# Patient Record
Sex: FEMALE | Race: WHITE | URBAN - METROPOLITAN AREA
[De-identification: names, ages, dates, MRNs, and addresses within clinical notes are randomized per-mention and may not be internally consistent; named-entity substitution may affect disease eponyms.]

---

## 2019-06-08 ENCOUNTER — APPOINTMENT (OUTPATIENT)
Dept: ULTRASOUND IMAGING | Facility: CLINIC | Age: 54
End: 2019-06-08
Attending: PHYSICIAN ASSISTANT

## 2019-06-08 ENCOUNTER — HOSPITAL ENCOUNTER (OUTPATIENT)
Facility: CLINIC | Age: 54
Setting detail: OBSERVATION
Discharge: HOME | End: 2019-06-09
Attending: FAMILY MEDICINE | Admitting: INTERNAL MEDICINE

## 2019-06-08 DIAGNOSIS — Z79.899 DRUG THERAPY: ICD-10-CM

## 2019-06-08 DIAGNOSIS — L03.114 CELLULITIS OF LEFT UPPER EXTREMITY: ICD-10-CM

## 2019-06-08 DIAGNOSIS — M45.9 ANKYLOSING SPONDYLITIS, UNSPECIFIED SITE OF SPINE (H): ICD-10-CM

## 2019-06-08 PROBLEM — E78.2 MIXED HYPERLIPIDEMIA: Status: ACTIVE | Noted: 2017-05-09

## 2019-06-08 PROBLEM — F32.A DEPRESSION: Status: ACTIVE | Noted: 2019-06-08

## 2019-06-08 PROBLEM — K21.9 ESOPHAGEAL REFLUX: Status: ACTIVE | Noted: 2019-06-08

## 2019-06-08 PROBLEM — L40.9 PSORIASIS: Status: ACTIVE | Noted: 2019-06-08

## 2019-06-08 PROBLEM — D84.9 IMMUNOSUPPRESSED STATUS (H): Status: ACTIVE | Noted: 2019-06-08

## 2019-06-08 PROBLEM — H35.371 EPIRETINAL MEMBRANE (ERM) OF RIGHT EYE: Status: ACTIVE | Noted: 2018-08-27

## 2019-06-08 PROBLEM — Z96.1 PSEUDOPHAKIA, BOTH EYES: Status: ACTIVE | Noted: 2018-10-02

## 2019-06-08 PROBLEM — N32.81 OVERACTIVE BLADDER: Status: ACTIVE | Noted: 2019-06-08

## 2019-06-08 PROBLEM — L08.9 SKIN INFECTION: Status: ACTIVE | Noted: 2019-06-08

## 2019-06-08 PROBLEM — F17.200 TOBACCO DEPENDENCE SYNDROME: Status: ACTIVE | Noted: 2019-06-08

## 2019-06-08 PROBLEM — R41.3 MEMORY DEFICIT: Status: ACTIVE | Noted: 2019-06-08

## 2019-06-08 LAB
ANION GAP SERPL CALCULATED.3IONS-SCNC: 8 MMOL/L (ref 3–14)
BASOPHILS # BLD AUTO: 0 10E9/L (ref 0–0.2)
BASOPHILS NFR BLD AUTO: 0.5 %
BUN SERPL-MCNC: 23 MG/DL (ref 7–30)
CALCIUM SERPL-MCNC: 8.9 MG/DL (ref 8.5–10.1)
CHLORIDE SERPL-SCNC: 104 MMOL/L (ref 94–109)
CO2 SERPL-SCNC: 24 MMOL/L (ref 20–32)
CREAT SERPL-MCNC: 0.87 MG/DL (ref 0.52–1.04)
CRP SERPL-MCNC: 30.6 MG/L (ref 0–8)
DIFFERENTIAL METHOD BLD: ABNORMAL
EOSINOPHIL # BLD AUTO: 0.1 10E9/L (ref 0–0.7)
EOSINOPHIL NFR BLD AUTO: 1.5 %
ERYTHROCYTE [DISTWIDTH] IN BLOOD BY AUTOMATED COUNT: 17.2 % (ref 10–15)
ERYTHROCYTE [SEDIMENTATION RATE] IN BLOOD BY WESTERGREN METHOD: 27 MM/H (ref 0–30)
GFR SERPL CREATININE-BSD FRML MDRD: 76 ML/MIN/{1.73_M2}
GLUCOSE SERPL-MCNC: 78 MG/DL (ref 70–99)
HCT VFR BLD AUTO: 38.4 % (ref 35–47)
HGB BLD-MCNC: 12 G/DL (ref 11.7–15.7)
IMM GRANULOCYTES # BLD: 0 10E9/L (ref 0–0.4)
IMM GRANULOCYTES NFR BLD: 0.1 %
LYMPHOCYTES # BLD AUTO: 2.6 10E9/L (ref 0.8–5.3)
LYMPHOCYTES NFR BLD AUTO: 30.1 %
MCH RBC QN AUTO: 26.1 PG (ref 26.5–33)
MCHC RBC AUTO-ENTMCNC: 31.3 G/DL (ref 31.5–36.5)
MCV RBC AUTO: 84 FL (ref 78–100)
MONOCYTES # BLD AUTO: 0.7 10E9/L (ref 0–1.3)
MONOCYTES NFR BLD AUTO: 8.6 %
NEUTROPHILS # BLD AUTO: 5 10E9/L (ref 1.6–8.3)
NEUTROPHILS NFR BLD AUTO: 59.2 %
NRBC # BLD AUTO: 0 10*3/UL
NRBC BLD AUTO-RTO: 0 /100
PLATELET # BLD AUTO: 290 10E9/L (ref 150–450)
POTASSIUM SERPL-SCNC: 4.5 MMOL/L (ref 3.4–5.3)
RBC # BLD AUTO: 4.6 10E12/L (ref 3.8–5.2)
SODIUM SERPL-SCNC: 136 MMOL/L (ref 133–144)
WBC # BLD AUTO: 8.5 10E9/L (ref 4–11)

## 2019-06-08 PROCEDURE — G0378 HOSPITAL OBSERVATION PER HR: HCPCS

## 2019-06-08 PROCEDURE — 25800030 ZZH RX IP 258 OP 636: Performed by: PHYSICIAN ASSISTANT

## 2019-06-08 PROCEDURE — 85652 RBC SED RATE AUTOMATED: CPT | Performed by: PHYSICIAN ASSISTANT

## 2019-06-08 PROCEDURE — 99285 EMERGENCY DEPT VISIT HI MDM: CPT | Mod: 25 | Performed by: FAMILY MEDICINE

## 2019-06-08 PROCEDURE — 76882 US LMTD JT/FCL EVL NVASC XTR: CPT | Mod: LT

## 2019-06-08 PROCEDURE — 25800030 ZZH RX IP 258 OP 636: Performed by: FAMILY MEDICINE

## 2019-06-08 PROCEDURE — 96365 THER/PROPH/DIAG IV INF INIT: CPT | Performed by: FAMILY MEDICINE

## 2019-06-08 PROCEDURE — 99220 ZZC INITIAL OBSERVATION CARE,LEVL III: CPT | Performed by: PHYSICIAN ASSISTANT

## 2019-06-08 PROCEDURE — 25000128 H RX IP 250 OP 636: Performed by: FAMILY MEDICINE

## 2019-06-08 PROCEDURE — 86140 C-REACTIVE PROTEIN: CPT | Performed by: PHYSICIAN ASSISTANT

## 2019-06-08 PROCEDURE — 80048 BASIC METABOLIC PNL TOTAL CA: CPT | Performed by: FAMILY MEDICINE

## 2019-06-08 PROCEDURE — 85025 COMPLETE CBC W/AUTO DIFF WBC: CPT | Performed by: FAMILY MEDICINE

## 2019-06-08 PROCEDURE — 84145 PROCALCITONIN (PCT): CPT | Performed by: INTERNAL MEDICINE

## 2019-06-08 PROCEDURE — 25000128 H RX IP 250 OP 636: Performed by: PHYSICIAN ASSISTANT

## 2019-06-08 PROCEDURE — 99285 EMERGENCY DEPT VISIT HI MDM: CPT | Mod: Z6 | Performed by: FAMILY MEDICINE

## 2019-06-08 RX ORDER — LEVOTHYROXINE SODIUM 50 UG/1
50 TABLET ORAL DAILY
Status: DISCONTINUED | OUTPATIENT
Start: 2019-06-09 | End: 2019-06-09 | Stop reason: HOSPADM

## 2019-06-08 RX ORDER — NALOXONE HYDROCHLORIDE 0.4 MG/ML
.1-.4 INJECTION, SOLUTION INTRAMUSCULAR; INTRAVENOUS; SUBCUTANEOUS
Status: DISCONTINUED | OUTPATIENT
Start: 2019-06-08 | End: 2019-06-09 | Stop reason: HOSPADM

## 2019-06-08 RX ORDER — OMEPRAZOLE 20 MG/1
20 TABLET, DELAYED RELEASE ORAL DAILY
Status: DISCONTINUED | OUTPATIENT
Start: 2019-06-09 | End: 2019-06-08

## 2019-06-08 RX ORDER — NALOXONE HYDROCHLORIDE 0.4 MG/ML
.1-.4 INJECTION, SOLUTION INTRAMUSCULAR; INTRAVENOUS; SUBCUTANEOUS
Status: DISCONTINUED | OUTPATIENT
Start: 2019-06-08 | End: 2019-06-08

## 2019-06-08 RX ORDER — SULFAMETHOXAZOLE/TRIMETHOPRIM 800-160 MG
1 TABLET ORAL 2 TIMES DAILY
Status: ON HOLD | COMMUNITY
Start: 2019-06-05 | End: 2019-06-09

## 2019-06-08 RX ORDER — MELOXICAM 7.5 MG/1
15 TABLET ORAL DAILY
Status: DISCONTINUED | OUTPATIENT
Start: 2019-06-09 | End: 2019-06-08

## 2019-06-08 RX ORDER — LEVOTHYROXINE SODIUM 50 UG/1
50 TABLET ORAL DAILY
COMMUNITY

## 2019-06-08 RX ORDER — ONDANSETRON 4 MG/1
4 TABLET, ORALLY DISINTEGRATING ORAL EVERY 6 HOURS PRN
Status: DISCONTINUED | OUTPATIENT
Start: 2019-06-08 | End: 2019-06-09 | Stop reason: HOSPADM

## 2019-06-08 RX ORDER — CELECOXIB 200 MG/1
200 CAPSULE ORAL 2 TIMES DAILY
Status: DISCONTINUED | OUTPATIENT
Start: 2019-06-09 | End: 2019-06-09 | Stop reason: HOSPADM

## 2019-06-08 RX ORDER — DONEPEZIL HYDROCHLORIDE 10 MG/1
10 TABLET, FILM COATED ORAL AT BEDTIME
COMMUNITY

## 2019-06-08 RX ORDER — ACETAMINOPHEN 650 MG/1
650 SUPPOSITORY RECTAL EVERY 4 HOURS PRN
Status: DISCONTINUED | OUTPATIENT
Start: 2019-06-08 | End: 2019-06-09 | Stop reason: HOSPADM

## 2019-06-08 RX ORDER — PROCHLORPERAZINE 25 MG
25 SUPPOSITORY, RECTAL RECTAL EVERY 12 HOURS PRN
Status: DISCONTINUED | OUTPATIENT
Start: 2019-06-08 | End: 2019-06-09 | Stop reason: HOSPADM

## 2019-06-08 RX ORDER — LEVOFLOXACIN 5 MG/ML
500 INJECTION, SOLUTION INTRAVENOUS EVERY 24 HOURS
Status: DISCONTINUED | OUTPATIENT
Start: 2019-06-08 | End: 2019-06-09 | Stop reason: HOSPADM

## 2019-06-08 RX ORDER — OMEPRAZOLE 20 MG/1
20 TABLET, DELAYED RELEASE ORAL DAILY
COMMUNITY

## 2019-06-08 RX ORDER — ACETAMINOPHEN 325 MG/1
650 TABLET ORAL EVERY 4 HOURS PRN
Status: DISCONTINUED | OUTPATIENT
Start: 2019-06-08 | End: 2019-06-08

## 2019-06-08 RX ORDER — ONDANSETRON 2 MG/ML
4 INJECTION INTRAMUSCULAR; INTRAVENOUS EVERY 6 HOURS PRN
Status: DISCONTINUED | OUTPATIENT
Start: 2019-06-08 | End: 2019-06-08

## 2019-06-08 RX ORDER — AMLODIPINE BESYLATE 5 MG/1
5 TABLET ORAL DAILY
Status: DISCONTINUED | OUTPATIENT
Start: 2019-06-09 | End: 2019-06-09 | Stop reason: HOSPADM

## 2019-06-08 RX ORDER — CLINDAMYCIN HCL 300 MG
300 CAPSULE ORAL 3 TIMES DAILY
Status: ON HOLD | COMMUNITY
End: 2019-06-09

## 2019-06-08 RX ORDER — CYCLOBENZAPRINE HCL 5 MG
5 TABLET ORAL 2 TIMES DAILY PRN
Status: DISCONTINUED | OUTPATIENT
Start: 2019-06-08 | End: 2019-06-09 | Stop reason: HOSPADM

## 2019-06-08 RX ORDER — ONDANSETRON 4 MG/1
4 TABLET, ORALLY DISINTEGRATING ORAL EVERY 6 HOURS PRN
Status: DISCONTINUED | OUTPATIENT
Start: 2019-06-08 | End: 2019-06-08

## 2019-06-08 RX ORDER — LORAZEPAM 0.5 MG/1
0.5 TABLET ORAL EVERY 4 HOURS PRN
Status: DISCONTINUED | OUTPATIENT
Start: 2019-06-08 | End: 2019-06-08

## 2019-06-08 RX ORDER — FLUOXETINE 40 MG/1
40 CAPSULE ORAL DAILY
COMMUNITY

## 2019-06-08 RX ORDER — MELOXICAM 7.5 MG/1
15 TABLET ORAL DAILY
Status: DISCONTINUED | OUTPATIENT
Start: 2019-06-08 | End: 2019-06-08

## 2019-06-08 RX ORDER — ATORVASTATIN CALCIUM 20 MG/1
40 TABLET, FILM COATED ORAL DAILY
Status: DISCONTINUED | OUTPATIENT
Start: 2019-06-09 | End: 2019-06-09 | Stop reason: HOSPADM

## 2019-06-08 RX ORDER — CLINDAMYCIN HCL 300 MG
450 CAPSULE ORAL 4 TIMES DAILY
Status: ON HOLD | COMMUNITY
Start: 2019-06-07 | End: 2019-06-09

## 2019-06-08 RX ORDER — VARENICLINE TARTRATE 1 MG/1
1 TABLET, FILM COATED ORAL 2 TIMES DAILY
COMMUNITY

## 2019-06-08 RX ORDER — DONEPEZIL HYDROCHLORIDE 10 MG/1
10 TABLET, FILM COATED ORAL AT BEDTIME
Status: DISCONTINUED | OUTPATIENT
Start: 2019-06-08 | End: 2019-06-08 | Stop reason: CLARIF

## 2019-06-08 RX ORDER — AMLODIPINE BESYLATE 5 MG/1
5 TABLET ORAL DAILY
COMMUNITY

## 2019-06-08 RX ORDER — MELOXICAM 15 MG/1
15 TABLET ORAL DAILY
COMMUNITY

## 2019-06-08 RX ORDER — DONEPEZIL HYDROCHLORIDE 10 MG/1
10 TABLET, FILM COATED ORAL DAILY
Status: DISCONTINUED | OUTPATIENT
Start: 2019-06-09 | End: 2019-06-09 | Stop reason: HOSPADM

## 2019-06-08 RX ORDER — FLUTICASONE PROPIONATE 50 MCG
1 SPRAY, SUSPENSION (ML) NASAL DAILY
COMMUNITY

## 2019-06-08 RX ORDER — FLUTICASONE PROPIONATE 50 MCG
1 SPRAY, SUSPENSION (ML) NASAL DAILY
Status: DISCONTINUED | OUTPATIENT
Start: 2019-06-09 | End: 2019-06-09 | Stop reason: HOSPADM

## 2019-06-08 RX ORDER — OXYBUTYNIN CHLORIDE 5 MG/1
10 TABLET, EXTENDED RELEASE ORAL DAILY
Status: DISCONTINUED | OUTPATIENT
Start: 2019-06-09 | End: 2019-06-09 | Stop reason: HOSPADM

## 2019-06-08 RX ORDER — CYCLOBENZAPRINE HCL 5 MG
5 TABLET ORAL 2 TIMES DAILY PRN
COMMUNITY

## 2019-06-08 RX ORDER — LIDOCAINE 40 MG/G
CREAM TOPICAL
Status: DISCONTINUED | OUTPATIENT
Start: 2019-06-08 | End: 2019-06-09 | Stop reason: HOSPADM

## 2019-06-08 RX ORDER — LORAZEPAM 2 MG/ML
0.5 INJECTION INTRAMUSCULAR EVERY 4 HOURS PRN
Status: DISCONTINUED | OUTPATIENT
Start: 2019-06-08 | End: 2019-06-08

## 2019-06-08 RX ORDER — OXYBUTYNIN CHLORIDE 10 MG/1
10 TABLET, EXTENDED RELEASE ORAL DAILY
COMMUNITY

## 2019-06-08 RX ORDER — ACETAMINOPHEN 325 MG/1
650 TABLET ORAL EVERY 4 HOURS PRN
Status: DISCONTINUED | OUTPATIENT
Start: 2019-06-08 | End: 2019-06-09 | Stop reason: HOSPADM

## 2019-06-08 RX ORDER — PROCHLORPERAZINE MALEATE 5 MG
10 TABLET ORAL EVERY 6 HOURS PRN
Status: DISCONTINUED | OUTPATIENT
Start: 2019-06-08 | End: 2019-06-09 | Stop reason: HOSPADM

## 2019-06-08 RX ORDER — CEFAZOLIN SODIUM 1 G/50ML
1250 SOLUTION INTRAVENOUS EVERY 12 HOURS
Status: DISCONTINUED | OUTPATIENT
Start: 2019-06-09 | End: 2019-06-09 | Stop reason: HOSPADM

## 2019-06-08 RX ORDER — ATORVASTATIN CALCIUM 40 MG/1
40 TABLET, FILM COATED ORAL DAILY
COMMUNITY

## 2019-06-08 RX ORDER — ONDANSETRON 2 MG/ML
4 INJECTION INTRAMUSCULAR; INTRAVENOUS EVERY 6 HOURS PRN
Status: DISCONTINUED | OUTPATIENT
Start: 2019-06-08 | End: 2019-06-09 | Stop reason: HOSPADM

## 2019-06-08 RX ORDER — LISINOPRIL 30 MG/1
30 TABLET ORAL DAILY
COMMUNITY

## 2019-06-08 RX ORDER — CEFAZOLIN SODIUM 1 G/50ML
1250 SOLUTION INTRAVENOUS EVERY 12 HOURS
Status: DISCONTINUED | OUTPATIENT
Start: 2019-06-08 | End: 2019-06-08

## 2019-06-08 RX ORDER — VARENICLINE TARTRATE 0.5 MG/1
1 TABLET, FILM COATED ORAL 2 TIMES DAILY
Status: DISCONTINUED | OUTPATIENT
Start: 2019-06-08 | End: 2019-06-09 | Stop reason: HOSPADM

## 2019-06-08 RX ADMIN — VANCOMYCIN HYDROCHLORIDE 1250 MG: 1 INJECTION, POWDER, LYOPHILIZED, FOR SOLUTION INTRAVENOUS at 17:31

## 2019-06-08 RX ADMIN — LEVOFLOXACIN 500 MG: 5 INJECTION, SOLUTION INTRAVENOUS at 16:26

## 2019-06-08 ASSESSMENT — ENCOUNTER SYMPTOMS
ROS SKIN COMMENTS: SEE HPI.
ABDOMINAL PAIN: 0
FEVER: 0
COLOR CHANGE: 1
CHILLS: 0
SHORTNESS OF BREATH: 0
DIZZINESS: 0
BRUISES/BLEEDS EASILY: 0
PSYCHIATRIC NEGATIVE: 1
NAUSEA: 0
EYES NEGATIVE: 1
COUGH: 0
WEAKNESS: 0

## 2019-06-08 ASSESSMENT — MIFFLIN-ST. JEOR: SCORE: 1527

## 2019-06-08 NOTE — PROGRESS NOTES
"[place] [facility]    Patient admitted to room 2302 at approximately 1715 via wheel chair from emergency room. Patient was accompanied by transport tech.     Verbal SBAR report received from TREVON Mueller prior to patient arrival.     Patient ambulated to bed independently. Patient alert and oriented X 3. The patient is not having any pain. 0-10 Pain Scale: 3. Admission vital signs: Blood pressure 112/49, pulse 62, temperature 98.5  F (36.9  C), temperature source Oral, resp. rate 16, height 1.626 m (5' 4\"), weight 94.2 kg (207 lb 10.8 oz), SpO2 98 %. Patient was oriented to plan of care, call light, bed controls, tv, telephone, bathroom and visiting hours.     Risk Assessment    The following safety risks were identified during admission: skin. Yellow risk band applied: NO.     Skin Initial Assessment    This writer admitted this patient and completed a full skin assessment and Daniel score in the Adult PCS flowsheet. Appropriate interventions initiated as needed.     Secondary skin check declined by patient.         Sobia Ortega"

## 2019-06-08 NOTE — H&P
[facility]    History and Physical - Hospitalist Service       Date of Admission:  6/8/2019    Assessment & Plan   Triny Terrell is a 54 year old female admitted on 6/8/2019. She presented to the emergency department for evaluation after failing outpatient oral antibiotic therapy for left arm cellulitis.    Cellulitis of left upper extremity - failed outpatient antibiotic therapy  Afebrile, no leukocytosis (WBC 8.5) upon admission. Prior to admission, patient had been treated at [place] for her cellulitis, records not available. Onset of cellulitis on 6/5/19 and started on Bactrim, worsening on 6/6 and clindamycin 300 mg qid added to Bactrim, still worse 6/7 and clindamycin increased to 450 qid - has failed outpatient antibiotics. Is immunosuppressed due to Humira. See physical exam for pictures on day of admission. Patient was started on vancomycin and Levaquin in the emergency department (severe penicillin allergy). Patient can move her elbow, low suspicion for septic joint involvement.  - Continue vancomycin and Levaquin (severe penicillin allergy)  - CRP, ESR, and procalcitonin pending  - Ultrasound of left arm/elbow pending to rule out abscess  - Consider discussion with ortho tomorrow regarding possible olecranon bursitis as source for infection  - Cellulitis borders were outlined and dated today (as well as during previous visits), and picture of rash on day of admission is present in the physical exam section    Ankylosing spondylitis (H)  Psoriasis  Immunosuppressed status - Humira  Has been on Humira for about 3 months prior to admission with significant reported improvements in symptoms. Last dose of Humira was 6/7/19.   - Not due again for Humira, hold    Essential hypertension  Pressures reviewed, normotensive. Managed prior to admission with lisinopril 30 mg daily and amlodipine 5 mg daily.   - Continue prior to admission lisinopril and amlodipine      Hypothyroidism  Managed prior to admission with levothyroxine 50 mcg q am, continue.    History of CVA (cerebrovascular accident)  Mixed hyperlipidemia  CVA in 2015. Managed prior to admission with Lipitor 40 mg daily, continue.    Memory deficit  No obvious cognitive deficits on exam. Managed prior to admission with Aricept 10 mg q hs, continue.    Esophageal reflux  Managed prior to admission with omeprazole 20 mg daily, continue.    Overactive bladder  Managed prior to admission with Ditropan XL 10 mg daily, continue.    Depression  Stable mood. Managed prior to admission with Savella 100 mg daily and Prozac 40 mg daily, continue.    Chronic tension-type headache, not intractable  Uses prn Excedrin, available prn.    History of uveitis  Uses eye drops prn, not ordered.    Tobacco dependence syndrome  Is on Chantix 1 mg bid and has cut down to 1 cigarette/day. Patient declines nicotine replacement.     Diet: Regular Diet Adult    DVT Prophylaxis: Low Risk/Ambulatory with no VTE prophylaxis indicated  Bolanos Catheter: not present  Code Status: Full - discussed with patient on admission    Disposition Plan   Expected discharge: Tomorrow, recommended to prior living arrangement once adequate pain management/ tolerating PO medications and antibiotic plan established.  Entered: Jaye Cole PA-C 06/08/2019, 6:18 PM     The patient's care was discussed with the Attending Physician, Dr. Cachorro Mccord. Plan also discussed with patient.    Jaye Cole PA-C  [facility]    ______________________________________________________________________    Chief Complaint   Left arm redness and warmth, not improving with outpatient antibiotics     History is obtained from the patient, review of EMR, and emergency department sign out from Dr. Tovar.    History of Present Illness   Triny Terrell is a 54 year old female who presented to the emergency department for evaluation of left arm  "redness.     Patient woke on 6/5/19 (4 days prior to admission) with some redness and warmth near her left elbow. She had some minor tenderness to palpation stating \"it felt like the olecranon bursitis I get in my right elbow,\" but otherwise denies any pain at rest. She denies any trauma or known lesion to her arm, but does state \"my mom thought she noticed a zit on my elbow 2 weeks ago, but I didn't notice it.\" She went to the [place] emergency department for evaluation and was diagnosed with cellulitis. She was started on Bactrim 800 mg bid.     The following day (6/6/19) the erythema had spread. She returned to the emergency department and was started on clindamycin 300 mg qid (in addition to the Bactrim).    Yesterday (6/7/19) the erythema again worsened, and she returned to the emergency department. Clindamycin dose was increased to 450 mg bid (in addition to the Bactrim).    This morning the redness is again larger, so she presented to the [facility] emergency department. She is being admitted for cellulitis that has failed outpatient treatment, starting IV antibiotics.    Patient denies having any known fevers (Tmax has been 99.9), but did have some chills the first 2 nights after onset. She is immunosuppressed due to being on Humira for her ankylosing spondylitis and psoriasis, last dose was yesterday 6/7.     She maintains full range of motion of her elbow, but has minor pain with full flexion of the elbow. Elbow is tender to palpation rated 8/10 but denies any pain when at rest.     On review of systems she mentions a mild cough that she attributes to post nasal drainage and has been present for about 1 week. She has a \"nagging headache\" that has been present for the past 3 days. She is feeling generally weak and fatigued but denies any focal weakness. The remainder review of systems is negative.    Review of Systems    The 10 point Review of Systems is negative other than noted in the HPI or here. "     Past Medical History    I have reviewed this patient's medical history and updated it with pertinent information if needed.   Past Medical History:   Diagnosis Date     Ankylosing spondylitis (H)      CVA (cerebral vascular accident) - 2015      Dysplasia of cervix, unspecified     Mild Dysplasia of cervix     Psoriasis        Past Surgical History   I have reviewed this patient's surgical history and updated it with pertinent information if needed.  Past Surgical History:   Procedure Laterality Date     CHOLECYSTECTOMY       COLONOSCOPY       SI INJECTION BILATERAL       SURGICAL HISTORY OF -   08/30/94    LEEP Cervical Biopsy     SURGICAL HISTORY OF -   06/27/94    Colposcopy - Cervical biopsy     SURGICAL HISTORY OF -   08/14/92    Cervical Tissue     TUBAL LIGATION         Social History   I have reviewed this patient's social history and updated it with pertinent information if needed.  Social History     Tobacco Use     Smoking status: Current Every Day Smoker     Types: Cigarettes     Smokeless tobacco: Never Used     Tobacco comment: As of 6/8/19 - down to 1 cigarette/day, on Chantix   Substance Use Topics     Alcohol use: Yes     Drug use: Never       Family History   I have reviewed this patient's family history and updated it with pertinent information if needed.   Family History   Problem Relation Age of Onset     Arthritis Mother      Hypertension Mother      Migraines Mother      Alzheimer Disease Father      Prostate Cancer Father      Gout Father      Hypertension Father      Hypertension Brother      Alcoholism Maternal Grandfather      Alcoholism Paternal Grandfather      Breast Cancer Maternal Aunt        Prior to Admission Medications   Prior to Admission Medications   Prescriptions Last Dose Informant Patient Reported? Taking?   FLUoxetine (PROZAC) 40 MG capsule 6/8/2019 at Unknown time  Yes Yes   Sig: Take 40 mg by mouth daily   adalimumab (HUMIRA) 40 MG/0.8ML prefilled syringe kit 6/7/2019  at Unknown time  Yes Yes   Sig: Inject 40 mg Subcutaneous every 14 days   amLODIPine (NORVASC) 5 MG tablet 6/8/2019 at Unknown time  Yes Yes   Sig: Take 5 mg by mouth daily   atorvastatin (LIPITOR) 40 MG tablet 6/8/2019 at Unknown time  Yes Yes   Sig: Take 40 mg by mouth daily   clindamycin (CLEOCIN) 300 MG capsule 6/8/2019 at Unknown time  Yes Yes   Sig: Take 450 mg by mouth 4 times daily   clindamycin (CLEOCIN) 300 MG capsule Past Week at Unknown time  Yes Yes   Sig: Take 300 mg by mouth 3 times daily   cyclobenzaprine (FLEXERIL) 5 MG tablet   Yes Yes   Sig: Take 5 mg by mouth 2 times daily as needed for muscle spasms   donepezil (ARICEPT) 10 MG tablet 6/7/2019 at Unknown time  Yes Yes   Sig: Take 10 mg by mouth At Bedtime   fluticasone (FLONASE) 50 MCG/ACT nasal spray 6/8/2019 at Unknown time  Yes Yes   Sig: Spray 1 spray into both nostrils daily   levothyroxine (SYNTHROID/LEVOTHROID) 50 MCG tablet 6/8/2019 at Unknown time  Yes Yes   Sig: Take 50 mcg by mouth daily   lisinopril (PRINIVIL/ZESTRIL) 30 MG tablet 6/8/2019 at Unknown time  Yes Yes   Sig: Take 30 mg by mouth daily   meloxicam (MOBIC) 15 MG tablet 6/8/2019 at Unknown time  Yes Yes   Sig: Take 15 mg by mouth daily   omeprazole (PRILOSEC OTC) 20 MG EC tablet 6/8/2019 at Unknown time  Yes Yes   Sig: Take 20 mg by mouth daily   oxybutynin ER (DITROPAN-XL) 10 MG 24 hr tablet 6/8/2019 at Unknown time  Yes Yes   Sig: Take 10 mg by mouth daily   sulfamethoxazole-trimethoprim (BACTRIM DS/SEPTRA DS) 800-160 MG tablet 6/8/2019 at Unknown time  Yes Yes   Sig: Take 1 tablet by mouth 2 times daily   varenicline (CHANTIX) 1 MG tablet 6/8/2019 at Unknown time  Yes Yes   Sig: Take 1 mg by mouth 2 times daily      Facility-Administered Medications: None     Allergies   Allergies   Allergen Reactions     Amoxicillin      Ampicillin      Sulfa Drugs        Physical Exam   Vital Signs: Temp: 98.5  F (36.9  C) Temp src: Oral BP: 112/49 Pulse: 62 Heart Rate: 69 Resp: 16 SpO2:  98 % O2 Device: None (Room air)    Weight: 207 lbs 10.77 oz    Constitutional: Alert, oriented, cooperative, no apparent distress, appears nontoxic, speaking in full sentences.     Eyes: Eyes are clear, pupils are reactive. No scleral icterus. Extroccular movements intact.     HEENT: Oropharynx is clear and moist, no lesions. Normocephalic, no evidence of cranial trauma.      Cardiovascular: Regular rhythm and rate, normal S1 and S2. No murmur, rubs, or gallops. Peripheral pulses intact bilaterally. No lower extremity edema.    Respiratory: Lung sounds are clear to auscultation bilaterally without wheezes, rhonchi, or crackles.    GI: Soft, non-distended. Non-tender, no rebound or guarding. No hepatosplenomegaly or masses appreciated. Normal bowel sounds.     Musculoskeletal: Without obvious deformity, normal range of motion. Normal muscle bulk and tone. Distal CMS intact. Left elbow is tender to palpation but no obvious effusion, fluctuance, or injury. Maintains full range of motion of left elbow, but develops some minor pain when elbow is nearly fully flexed.    Skin: Warm and dry, no ecchymoses. No mottling of skin. Left upper extremity with confluent flat erythema from about 3 inches proximal to the elbow, spreading down near the wrist. Patient also has some faint erythema near the right wrist near the IV site that started after Levaquin infusion - does not appear urticarial and patient had been scratching the area - suspect excoriation induced erythema (see picture).    Left upper extremity, dorsal aspect:      Left upper extremity, medial aspect:      Right upper extremity - faint erythema near the IV site (new since IV placement):           Neurologic: Patient moves all extremities. Cranial nerves are grossly intact.  is symmetric. Gross strength and sensation are equal bilaterally.    Genitourinary: Deferred      Data   Data reviewed today: I reviewed all medications, new labs and imaging results over  the last 24 hours. I personally reviewed no images or EKG's today.    Recent Labs   Lab 06/08/19  1213   WBC 8.5   HGB 12.0   MCV 84         POTASSIUM 4.5   CHLORIDE 104   CO2 24   BUN 23   CR 0.87   ANIONGAP 8   SHAWN 8.9   GLC 78     No results found for this or any previous visit (from the past 24 hour(s)).

## 2019-06-08 NOTE — ED NOTES
Pt was seen on Wednesday for a cellulitis in the L forearm and elbow in [place]. The cellulitis was circled with marker and has grown past the boundaries even though pt has been on sulfa since then. Pt was seen again on Thurs in [place] and put on clindamycin.

## 2019-06-08 NOTE — ED PROVIDER NOTES
History     Chief Complaint   Patient presents with     Cellulitis     cellulitis on left arm on antibotics worsening symptoms     HPI  Triny Terrell is a 54 year old female who presents to the ED complaining of cellulitis. Patient states that on Wednesday, 3 days ago, she woke up with pain and redness on her left elbow and went in to the Hospital in [place] where she was started on Bactrim. Later that night the patient recalls going to the ED because her cellulitis had worsened and spread to her forearm. The patient was then started on Clindamyin 300 mg qid. She was seen again in [place] yesterday and was instructed to increase her regiment of clindamycin 450 mg qid. At time of assessment, patient describes the pain as a 0 that increases rapidly to an 8 when touched. Pain is primarily in olecranon region.    Additional factor is immunosuppression. Using Humira for ankylosing spondylitis.    HX is in the [facility] record in Care Everywhere.      SHx: Lives in [place]  PMHx: Asthma, CVA 12/1/2015, Ankylosing spondylitis 1/13/2014,      11:38 AM    Allergies:  Allergies   Allergen Reactions     Amoxicillin      Ampicillin      Sulfa Drugs        Problem List:    Patient Active Problem List    Diagnosis Date Noted     Pseudophakia, both eyes 10/02/2018     Priority: Medium     Epiretinal membrane (ERM) of right eye 08/27/2018     Priority: Medium     Mixed hyperlipidemia 05/09/2017     Priority: Medium     Hypothyroidism 07/13/2016     Priority: Medium     Vitamin D deficiency 07/13/2016     Priority: Medium     History of uveitis 12/02/2015     Priority: Medium     CVA (cerebral vascular accident) (H) 12/01/2015     Priority: Medium     IMPRESSION:     1.  Subacute infarct stable left medial frontal cortex.     2.  Some subtle punctate subacute infarcts more posterior cephalad subcortical left frontal regions suspected.     2.  No hemorrhage or masses.  4.  No demyelination.     5.  No areas of acute  ischemia.  6.  Some motion artifact obscures detail.        Chronic tension-type headache, not intractable 12/01/2015     Priority: Medium     Ankylosing spondylitis (H) 01/13/2014     Priority: Medium     Disorder of sacrum 01/13/2014     Priority: Medium     Unspecified iridocyclitis 10/07/2013     Priority: Medium     Sacroiliitis, not elsewhere classified (H) 05/15/2013     Priority: Medium     Essential hypertension 10/21/2009     Priority: Medium     Migraine with aura 09/22/2009     Priority: Medium     Myalgia and myositis 07/24/2008     Priority: Medium        Past Medical History:    Past Medical History:   Diagnosis Date     DYSPLASIA OF CERVIX NOS        Past Surgical History:    Past Surgical History:   Procedure Laterality Date     SURGICAL HISTORY OF -   08/30/94    LEEP Cervical Biopsy     SURGICAL HISTORY OF - 06/27/94    Colposcopy - Cervical biopsy     SURGICAL HISTORY OF - 08/14/92    Cervical Tissue       Family History:    No family history on file.    Social History:  Marital Status:   [2]  Social History     Tobacco Use     Smoking status: Not on file   Substance Use Topics     Alcohol use: Not on file     Drug use: Not on file        Medications:      No current outpatient medications on file.      Review of Systems   Constitutional: Negative for chills and fever.   HENT: Negative.    Eyes: Negative.    Respiratory: Negative for cough and shortness of breath.    Gastrointestinal: Negative for abdominal pain and nausea.   Genitourinary: Negative.    Musculoskeletal:        Elbow / olecranon tenderness.   Skin: Positive for color change and rash.        See HPI.   Neurological: Negative for dizziness and weakness.   Hematological: Does not bruise/bleed easily.   Psychiatric/Behavioral: Negative.    All other systems reviewed and are negative.      Physical Exam   BP: 109/61  Heart Rate: 69  Temp: 98  F (36.7  C)  Resp: 14  Weight: 92.5 kg (204 lb)  SpO2: 98 %      Physical Exam    Constitutional: She appears well-developed. No distress.   HENT:   Head: Normocephalic and atraumatic.   Eyes: No scleral icterus.   Neck: No thyromegaly present.   Cardiovascular: Regular rhythm.   No murmur heard.  Pulmonary/Chest: Effort normal and breath sounds normal.   Abdominal: She exhibits no distension. There is no tenderness.   Musculoskeletal: She exhibits tenderness.   Elbow / olecranon tenderness, full ROM. No fluctuance.   Neurological: No cranial nerve deficit. Coordination normal.   Skin: Rash noted. There is erythema.   Redness posterior aspect L upper extremity from above elbow extending down to L wrist. Expanding beyond several lines drawn on her skin.   Psychiatric: She has a normal mood and affect.   Not super tender  Redness on posterior aspect of elbow and forearm    Pain is concentrated along the medial aspect of the forearm    ED Course        Procedures               Critical Care time:  none               Results for orders placed or performed during the hospital encounter of 06/08/19 (from the past 24 hour(s))   CBC with platelets differential   Result Value Ref Range    WBC 8.5 4.0 - 11.0 10e9/L    RBC Count 4.60 3.8 - 5.2 10e12/L    Hemoglobin 12.0 11.7 - 15.7 g/dL    Hematocrit 38.4 35.0 - 47.0 %    MCV 84 78 - 100 fl    MCH 26.1 (L) 26.5 - 33.0 pg    MCHC 31.3 (L) 31.5 - 36.5 g/dL    RDW 17.2 (H) 10.0 - 15.0 %    Platelet Count 290 150 - 450 10e9/L    Diff Method Automated Method     % Neutrophils 59.2 %    % Lymphocytes 30.1 %    % Monocytes 8.6 %    % Eosinophils 1.5 %    % Basophils 0.5 %    % Immature Granulocytes 0.1 %    Nucleated RBCs 0 0 /100    Absolute Neutrophil 5.0 1.6 - 8.3 10e9/L    Absolute Lymphocytes 2.6 0.8 - 5.3 10e9/L    Absolute Monocytes 0.7 0.0 - 1.3 10e9/L    Absolute Eosinophils 0.1 0.0 - 0.7 10e9/L    Absolute Basophils 0.0 0.0 - 0.2 10e9/L    Abs Immature Granulocytes 0.0 0 - 0.4 10e9/L    Absolute Nucleated RBC 0.0    Basic metabolic panel   Result Value  Ref Range    Sodium 136 133 - 144 mmol/L    Potassium 4.5 3.4 - 5.3 mmol/L    Chloride 104 94 - 109 mmol/L    Carbon Dioxide 24 20 - 32 mmol/L    Anion Gap 8 3 - 14 mmol/L    Glucose 78 70 - 99 mg/dL    Urea Nitrogen 23 7 - 30 mg/dL    Creatinine 0.87 0.52 - 1.04 mg/dL    GFR Estimate 76 >60 mL/min/[1.73_m2]    GFR Estimate If Black 88 >60 mL/min/[1.73_m2]    Calcium 8.9 8.5 - 10.1 mg/dL       Medications   vancomycin (VANCOCIN) 1,250 mg in sodium chloride 0.9 % 250 mL intermittent infusion (has no administration in time range)   levofloxacin (LEVAQUIN) infusion 500 mg (has no administration in time range)       Assessments & Plan (with Medical Decision Making)       This patient presented with expanding area of cellulitis in her left forearm.  This seems to have begun with an infected left olecranon bursa.  She is basically immunosuppressed due to her Humira treatment for her ankylosing spondylitis.  History above records various outpatient efforts to treat which have not been successful.  Therefore she is admitted at this time for IV antibiotics.  Could consider orthopedic consultation regarding the bursa.    I discussed this case with the hospital service Yeny Cole and Dr Fraser and they accept in transfer.        I have reviewed the nursing notes.    I have reviewed the findings, diagnosis, plan and need for follow up with the patient.          Medication List      There are no discharge medications for this visit.         Final diagnoses:   Cellulitis of left upper extremity   Ankylosing spondylitis, unspecified site of spine (H)   This document serves as a record of services personally performed by Karlo Tovar MD. It was created on their behalf by Brown Marcos, a trained medical scribe. The creation of this record is based on the provider's personal observations and the statements of the patient. This document has been checked and approved by the attending provider.    Note: Chart documentation  done in part with Dragon Voice Recognition software. Although reviewed after completion, some word and grammatical errors may remain.      6/8/2019   [facility] EMERGENCY DEPARTMENT     Karlo Tovar MD  06/08/19 [id number]

## 2019-06-08 NOTE — LETTER
To whom it may concern:          Triny Terrell is to be excused from work for medical reasons from 6/8/19 to 6/12/19.            Sincerely,          Froilan Kennedy MD

## 2019-06-09 VITALS
TEMPERATURE: 96.9 F | SYSTOLIC BLOOD PRESSURE: 110 MMHG | HEIGHT: 64 IN | DIASTOLIC BLOOD PRESSURE: 51 MMHG | HEART RATE: 72 BPM | RESPIRATION RATE: 18 BRPM | OXYGEN SATURATION: 98 % | WEIGHT: 207.67 LBS | BODY MASS INDEX: 35.45 KG/M2

## 2019-06-09 LAB
ANION GAP SERPL CALCULATED.3IONS-SCNC: 5 MMOL/L (ref 3–14)
BUN SERPL-MCNC: 27 MG/DL (ref 7–30)
CALCIUM SERPL-MCNC: 9.1 MG/DL (ref 8.5–10.1)
CHLORIDE SERPL-SCNC: 107 MMOL/L (ref 94–109)
CO2 SERPL-SCNC: 28 MMOL/L (ref 20–32)
CREAT SERPL-MCNC: 1.15 MG/DL (ref 0.52–1.04)
CRP SERPL-MCNC: 19.7 MG/L (ref 0–8)
ERYTHROCYTE [DISTWIDTH] IN BLOOD BY AUTOMATED COUNT: 17.2 % (ref 10–15)
ERYTHROCYTE [SEDIMENTATION RATE] IN BLOOD BY WESTERGREN METHOD: 26 MM/H (ref 0–30)
GFR SERPL CREATININE-BSD FRML MDRD: 54 ML/MIN/{1.73_M2}
GLUCOSE SERPL-MCNC: 87 MG/DL (ref 70–99)
HCT VFR BLD AUTO: 35.9 % (ref 35–47)
HGB BLD-MCNC: 11 G/DL (ref 11.7–15.7)
MCH RBC QN AUTO: 25.8 PG (ref 26.5–33)
MCHC RBC AUTO-ENTMCNC: 30.6 G/DL (ref 31.5–36.5)
MCV RBC AUTO: 84 FL (ref 78–100)
PLATELET # BLD AUTO: 293 10E9/L (ref 150–450)
POTASSIUM SERPL-SCNC: 4.3 MMOL/L (ref 3.4–5.3)
PROCALCITONIN SERPL-MCNC: <0.05 NG/ML
RBC # BLD AUTO: 4.27 10E12/L (ref 3.8–5.2)
SODIUM SERPL-SCNC: 140 MMOL/L (ref 133–144)
WBC # BLD AUTO: 7.3 10E9/L (ref 4–11)

## 2019-06-09 PROCEDURE — 25000132 ZZH RX MED GY IP 250 OP 250 PS 637: Performed by: FAMILY MEDICINE

## 2019-06-09 PROCEDURE — 85027 COMPLETE CBC AUTOMATED: CPT | Performed by: PHYSICIAN ASSISTANT

## 2019-06-09 PROCEDURE — 25800030 ZZH RX IP 258 OP 636: Performed by: PHYSICIAN ASSISTANT

## 2019-06-09 PROCEDURE — 25000128 H RX IP 250 OP 636: Performed by: PHYSICIAN ASSISTANT

## 2019-06-09 PROCEDURE — G0378 HOSPITAL OBSERVATION PER HR: HCPCS

## 2019-06-09 PROCEDURE — 86140 C-REACTIVE PROTEIN: CPT | Performed by: PHYSICIAN ASSISTANT

## 2019-06-09 PROCEDURE — 99217 ZZC OBSERVATION CARE DISCHARGE: CPT | Performed by: FAMILY MEDICINE

## 2019-06-09 PROCEDURE — 36415 COLL VENOUS BLD VENIPUNCTURE: CPT | Performed by: PHYSICIAN ASSISTANT

## 2019-06-09 PROCEDURE — 85652 RBC SED RATE AUTOMATED: CPT | Performed by: PHYSICIAN ASSISTANT

## 2019-06-09 PROCEDURE — 25000132 ZZH RX MED GY IP 250 OP 250 PS 637: Performed by: PHYSICIAN ASSISTANT

## 2019-06-09 PROCEDURE — 80048 BASIC METABOLIC PNL TOTAL CA: CPT | Performed by: PHYSICIAN ASSISTANT

## 2019-06-09 PROCEDURE — 96376 TX/PRO/DX INJ SAME DRUG ADON: CPT

## 2019-06-09 RX ORDER — LIDOCAINE 50 MG/G
OINTMENT TOPICAL 2 TIMES DAILY PRN
COMMUNITY

## 2019-06-09 RX ORDER — ALBUTEROL SULFATE 0.83 MG/ML
2.5 SOLUTION RESPIRATORY (INHALATION) EVERY 6 HOURS PRN
COMMUNITY

## 2019-06-09 RX ORDER — SULFAMETHOXAZOLE/TRIMETHOPRIM 800-160 MG
1 TABLET ORAL 2 TIMES DAILY
Qty: 14 TABLET | Refills: 0 | Status: SHIPPED | OUTPATIENT
Start: 2019-06-09 | End: 2019-06-16

## 2019-06-09 RX ORDER — LEVOFLOXACIN 500 MG/1
500 TABLET, FILM COATED ORAL DAILY
Qty: 7 TABLET | Refills: 0 | Status: SHIPPED | OUTPATIENT
Start: 2019-06-09 | End: 2019-06-16

## 2019-06-09 RX ORDER — ALBUTEROL SULFATE 90 UG/1
1-2 AEROSOL, METERED RESPIRATORY (INHALATION) EVERY 6 HOURS PRN
COMMUNITY

## 2019-06-09 RX ORDER — DORZOLAMIDE HCL 20 MG/ML
1 SOLUTION/ DROPS OPHTHALMIC 2 TIMES DAILY
Status: ON HOLD | COMMUNITY
End: 2019-06-09

## 2019-06-09 RX ORDER — ONDANSETRON 8 MG/1
8 TABLET, ORALLY DISINTEGRATING ORAL EVERY 6 HOURS PRN
COMMUNITY

## 2019-06-09 RX ADMIN — LEVOTHYROXINE SODIUM 50 MCG: 50 TABLET ORAL at 08:05

## 2019-06-09 RX ADMIN — FLUOXETINE 40 MG: 20 CAPSULE ORAL at 08:03

## 2019-06-09 RX ADMIN — DONEPEZIL HYDROCHLORIDE 10 MG: 10 TABLET, FILM COATED ORAL at 08:04

## 2019-06-09 RX ADMIN — AMLODIPINE BESYLATE 5 MG: 5 TABLET ORAL at 08:05

## 2019-06-09 RX ADMIN — ATORVASTATIN CALCIUM 40 MG: 20 TABLET, FILM COATED ORAL at 08:09

## 2019-06-09 RX ADMIN — VANCOMYCIN HYDROCHLORIDE 1250 MG: 1 INJECTION, POWDER, LYOPHILIZED, FOR SOLUTION INTRAVENOUS at 05:09

## 2019-06-09 RX ADMIN — OMEPRAZOLE 20 MG: 20 CAPSULE, DELAYED RELEASE ORAL at 05:14

## 2019-06-09 RX ADMIN — CELECOXIB 200 MG: 200 CAPSULE ORAL at 08:04

## 2019-06-09 RX ADMIN — LISINOPRIL 30 MG: 20 TABLET ORAL at 08:04

## 2019-06-09 RX ADMIN — OXYBUTYNIN CHLORIDE 10 MG: 5 TABLET, EXTENDED RELEASE ORAL at 08:04

## 2019-06-09 RX ADMIN — VARENICLINE TARTRATE 1 MG: 0.5 TABLET, FILM COATED ORAL at 08:09

## 2019-06-09 RX ADMIN — FLUTICASONE PROPIONATE 1 SPRAY: 50 SPRAY, METERED NASAL at 08:09

## 2019-06-09 NOTE — PLAN OF CARE
Left arm redness has receded within marked area this morning & less swelling noted. Pt has not needed pain meds tonight. Afebrile.

## 2019-06-09 NOTE — PROGRESS NOTES
"Pt called this nurse into room, had IV vanco running, playing on phone & stated felt \"tight, itchy, burning feeling\" in right inner forearm. IV infiltrated with IV vanco, has blanching of skin & swelling. Area marked, ice pack applied. Pharmacy consulted. SL removed  "

## 2019-06-09 NOTE — PLAN OF CARE
Pt states she is comfortable, declines pain meds. Afebrile. Small pink area extends out of marked area to back of forearm(no change since last evening). Spoke with ynes KENNEDY, no new orders. Pt able to move left arm/elbow without restriction. Elevated on pillow.

## 2019-06-09 NOTE — DISCHARGE INSTRUCTIONS
You have a follow up appointment on June 14th at 3:30PM with Billie Gunderson at [facility].    Plan for right forearm following intravenous infiltration: Report new fever, chills, blistering, skin sloughing, worsening pain, and swelling. Have provider assess with next follow up appointment or sooner if needed.

## 2019-06-09 NOTE — PROGRESS NOTES
Patient has  [place] to Observation  order. Patient has been given Patient Bill of Rights, Observation brochure and  What does Observation mean to me  forms.  Patient has been given the opportunity to ask questions about observation status and their plan of care. Jeri GRANADOS, [facility], [facility] [phone]

## 2019-06-09 NOTE — PLAN OF CARE
"Pt up independently in room. Denies need for pain meds, states she is comfortable. States left elbow \"contantly achy but its tolerable\". Up ambulating in [place]. Had US of left elbow at 2030, results paged to ynes KENNEDY. Afebrile.   "

## 2019-06-09 NOTE — PROGRESS NOTES
[place] [facility] DISCHARGE NOTE    Patient discharged to home at 3:05 PM via wheel chair. Accompanied by daughter and staff. Discharge instructions reviewed with patient, opportunity offered to ask questions. Prescriptions sent to patients preferred pharmacy. All belongings sent with patient.    Sobia Ortega

## 2019-06-09 NOTE — DISCHARGE SUMMARY
[facility] Discharge Summary    Triny Terrell MRN# [id number]   Age: 54 year old YOB: 1965     Date of Admission:  6/8/2019  Date of Discharge::  6/9/2019  Admitting Physician:  Cachorro Mccord MD  Discharge Physician:  Froilan Kennedy MD, MD             Admission Diagnoses:   Cellulitis of left upper extremity [L03.114]  Ankylosing spondylitis, unspecified site of spine (H) [M45.9]          Principle Discharge Diagnosis:       Cellulitis of left upper extremity  - failed outpatient antibiotic therapy and complicated by humira as below    See hospital course for further active diagnoses addressed during this admission.            Procedures:   No procedures performed during this admission          Medications Prior to Admission:     Medications Prior to Admission   Medication Sig Dispense Refill Last Dose     adalimumab (HUMIRA) 40 MG/0.8ML prefilled syringe kit Inject 40 mg Subcutaneous every 14 days   6/5/2019 at pm     albuterol (PROAIR HFA/PROVENTIL HFA/VENTOLIN HFA) 108 (90 Base) MCG/ACT inhaler Inhale 1-2 puffs into the lungs every 6 hours as needed for shortness of breath / dyspnea or wheezing   Past Week at Unknown time     amLODIPine (NORVASC) 5 MG tablet Take 5 mg by mouth daily   6/8/2019 at Unknown time     atorvastatin (LIPITOR) 40 MG tablet Take 40 mg by mouth daily   6/8/2019 at Unknown time     cyclobenzaprine (FLEXERIL) 5 MG tablet Take 5 mg by mouth 2 times daily as needed for muscle spasms   Past Week at Unknown time     donepezil (ARICEPT) 10 MG tablet Take 10 mg by mouth At Bedtime   6/7/2019 at Unknown time     FLUoxetine (PROZAC) 40 MG capsule Take 40 mg by mouth daily   6/8/2019 at Unknown time     fluticasone (FLONASE) 50 MCG/ACT nasal spray Spray 1 spray into both nostrils daily   6/8/2019 at Unknown time     levothyroxine (SYNTHROID/LEVOTHROID) 50 MCG tablet Take 50 mcg by mouth daily   6/8/2019 at Unknown time     lidocaine (XYLOCAINE) 5 % external ointment  Apply topically 2 times daily as needed Apply to SI joint region   6/8/2019 at Unknown time     lisinopril (PRINIVIL/ZESTRIL) 30 MG tablet Take 30 mg by mouth daily   6/8/2019 at Unknown time     meloxicam (MOBIC) 15 MG tablet Take 15 mg by mouth daily    6/8/2019 at Unknown time     milnacipran (SAVELLA) 100 MG TABS tablet Take 100 mg by mouth daily   6/8/2019 at Unknown time     omeprazole (PRILOSEC OTC) 20 MG EC tablet Take 20 mg by mouth daily   6/8/2019 at Unknown time     ondansetron (ZOFRAN-ODT) 8 MG ODT tab Take 8 mg by mouth every 6 hours as needed for nausea   6/8/2019 at Unknown time     oxybutynin ER (DITROPAN-XL) 10 MG 24 hr tablet Take 10 mg by mouth daily   6/8/2019 at Unknown time     varenicline (CHANTIX) 1 MG tablet Take 1 mg by mouth 2 times daily   6/8/2019 at Unknown time     albuterol (PROVENTIL) (2.5 MG/3ML) 0.083% neb solution Take 2.5 mg by nebulization every 6 hours as needed for shortness of breath / dyspnea or wheezing   More than a month at Unknown time     [DISCONTINUED] clindamycin (CLEOCIN) 300 MG capsule Take 450 mg by mouth 4 times daily   6/8/2019 at Unknown time     [DISCONTINUED] sulfamethoxazole-trimethoprim (BACTRIM DS/SEPTRA DS) 800-160 MG tablet Take 1 tablet by mouth 2 times daily   6/8/2019 at Unknown time             Discharge Medications:     Current Discharge Medication List      START taking these medications    Details   levofloxacin (LEVAQUIN) 500 MG tablet Take 1 tablet (500 mg) by mouth daily for 7 days  Qty: 7 tablet, Refills: 0    Associated Diagnoses: Cellulitis of left upper extremity         CONTINUE these medications which have CHANGED    Details   sulfamethoxazole-trimethoprim (BACTRIM DS/SEPTRA DS) 800-160 MG tablet Take 1 tablet by mouth 2 times daily for 7 days  Qty: 14 tablet, Refills: 0    Associated Diagnoses: Cellulitis of left upper extremity         CONTINUE these medications which have NOT CHANGED    Details   adalimumab (HUMIRA) 40 MG/0.8ML  prefilled syringe kit Inject 40 mg Subcutaneous every 14 days      albuterol (PROAIR HFA/PROVENTIL HFA/VENTOLIN HFA) 108 (90 Base) MCG/ACT inhaler Inhale 1-2 puffs into the lungs every 6 hours as needed for shortness of breath / dyspnea or wheezing      amLODIPine (NORVASC) 5 MG tablet Take 5 mg by mouth daily      atorvastatin (LIPITOR) 40 MG tablet Take 40 mg by mouth daily      cyclobenzaprine (FLEXERIL) 5 MG tablet Take 5 mg by mouth 2 times daily as needed for muscle spasms      donepezil (ARICEPT) 10 MG tablet Take 10 mg by mouth At Bedtime      FLUoxetine (PROZAC) 40 MG capsule Take 40 mg by mouth daily      fluticasone (FLONASE) 50 MCG/ACT nasal spray Spray 1 spray into both nostrils daily      levothyroxine (SYNTHROID/LEVOTHROID) 50 MCG tablet Take 50 mcg by mouth daily      lidocaine (XYLOCAINE) 5 % external ointment Apply topically 2 times daily as needed Apply to SI joint region      lisinopril (PRINIVIL/ZESTRIL) 30 MG tablet Take 30 mg by mouth daily      meloxicam (MOBIC) 15 MG tablet Take 15 mg by mouth daily       milnacipran (SAVELLA) 100 MG TABS tablet Take 100 mg by mouth daily      omeprazole (PRILOSEC OTC) 20 MG EC tablet Take 20 mg by mouth daily      ondansetron (ZOFRAN-ODT) 8 MG ODT tab Take 8 mg by mouth every 6 hours as needed for nausea      oxybutynin ER (DITROPAN-XL) 10 MG 24 hr tablet Take 10 mg by mouth daily      varenicline (CHANTIX) 1 MG tablet Take 1 mg by mouth 2 times daily      albuterol (PROVENTIL) (2.5 MG/3ML) 0.083% neb solution Take 2.5 mg by nebulization every 6 hours as needed for shortness of breath / dyspnea or wheezing         STOP taking these medications       clindamycin (CLEOCIN) 300 MG capsule Comments:   Reason for Stopping:                     Consultations:   No consultations were requested during this admission          Brief History of Illness:     From Admission H+P:     Triny Terrell is a 54 year old female who presented to the emergency department for  "evaluation of left arm redness.      Patient woke on 6/5/19 (4 days prior to admission) with some redness and warmth near her left elbow. She had some minor tenderness to palpation stating \"it felt like the olecranon bursitis I get in my right elbow,\" but otherwise denies any pain at rest. She denies any trauma or known lesion to her arm, but does state \"my mom thought she noticed a zit on my elbow 2 weeks ago, but I didn't notice it.\" She went to the [facility] emergency department for evaluation and was diagnosed with cellulitis. She was started on Bactrim 800 mg bid.      The following day (6/6/19) the erythema had spread. She returned to the emergency department and was started on clindamycin 300 mg qid (in addition to the Bactrim).     Yesterday (6/7/19) the erythema again worsened, and she returned to the emergency department. Clindamycin dose was increased to 450 mg bid (in addition to the Bactrim).     This morning the redness is again larger, so she presented to the [facility] emergency department. She is being admitted for cellulitis that has failed outpatient treatment, starting IV antibiotics.     Patient denies having any known fevers (Tmax has been 99.9), but did have some chills the first 2 nights after onset. She is immunosuppressed due to being on Humira for her ankylosing spondylitis and psoriasis, last dose was yesterday 6/7.      She maintains full range of motion of her elbow, but has minor pain with full flexion of the elbow. Elbow is tender to palpation rated 8/10 but denies any pain when at rest.      On review of systems she mentions a mild cough that she attributes to post nasal drainage and has been present for about 1 week. She has a \"nagging headache\" that has been present for the past 3 days. She is feeling generally weak and fatigued but denies any focal weakness. The remainder review of systems is negative.                     TODAY:     Subjective:  Markedly improved overnight.  " "Redness regressed and much much less red than yesterday per nursing and patient.  Much less painful as well, has full range of motion of elbow.  Still \"a bit tender\" with direct contact over olecranon bursa, but no notable swelling of bursa and this too is clearly improved.    Area of infiltration of IV on right forearm when getting vanco is doing well.  Just \"slightly sore\" feeling, no pain, no swelling, no redness.  No fever or chills.  No worsening or new symptoms.  No other new concerns.   No other pain     ROS:   ROS: 10 point ROS neg other than the symptoms noted above in the HPI.     /51 (BP Location: Right arm)   Pulse 72   Temp 96.9  F (36.1  C) (Oral)   Resp 18   Ht 1.626 m (5' 4\")   Wt 94.2 kg (207 lb 10.8 oz)   SpO2 98%   BMI 35.65 kg/m     EXAM:  General: awake and alert, NAD, oriented x 3  Head: normocephalic  Neck: unremarkable, no lymphadenopathy   HEENT: oropharynx pink and moist    Heart: Regular rate and rhythm, no murmurs, rubs, or gallops  Lungs: clear to auscultation bilaterally with good air movement throughout  Abdomen: soft, non-tender, no masses or organomegaly  Left elbow/arm: still some erythema starting above elbow down dorsal forearm but regressed from line and markedly less red - no tenderness other than very mildly so over olecranon bursa, no fluctuance, feels and looks much better per patient.    Right forearm: area of possible infiltrated IV while getting vanco looks good this afternoon - without markings would be unrecognizable, very slightly sore with palpation but really no tenderness, no erythema and no notable swelling or other changes.     Extremities: no edema in lower extremities   Skin unremarkable.            Hospital Course:       Triny Terrell is a 54 year old female admitted on 6/8/2019. She presented to the emergency department for evaluation after failing outpatient oral antibiotic therapy for left arm cellulitis.     Cellulitis of left upper extremity  " - failed outpatient antibiotic therapy and complicated by humira as below  6/8/19 -- Afebrile, no leukocytosis (WBC 8.5) upon admission. Prior to admission, patient had been treated at [facility] for her cellulitis, records not available. Onset of cellulitis on 6/5/19 and started on Bactrim, worsening on 6/6 and clindamycin 300 mg qid added to Bactrim, still worse 6/7 and clindamycin increased to 450 qid - has failed outpatient antibiotics. Is immunosuppressed due to Humira. See physical exam for pictures on day of admission. Patient was started on vancomycin and Levaquin in the emergency department (severe penicillin allergy). Patient can move her elbow, low suspicion for septic joint involvement.  - Continue vancomycin and Levaquin (severe penicillin allergy)  - CRP, ESR, and procalcitonin pending  - Ultrasound of left arm/elbow pending to rule out abscess  - Consider discussion with ortho tomorrow regarding possible olecranon bursitis as source for infection  - Cellulitis borders were outlined and dated today (as well as during previous visits), and picture of rash on day of admission is present in the physical exam section  6/9/2019 -- US just showed subcutaneous interstitial edema, no abscess.  Mildly tender over olecranon bursa, but no clear swelling or olecranon bursitis, certainly nothing to drain - no clear MRSA history, although patient says on one admission at an outside hospital they gowned up and told her it was due to that, and improved markedly overnight even without getting complete dose of vanco - appears to be notably improving on the levaquin.  Discussed watching for another night vs discharge - patient would like to discharge home which I think is reasonable - will discharge to complete 7 more days levaquin along with bactrim.  Follow-up with primary care provider within 1 week.       Ankylosing spondylitis (H)  Psoriasis  Immunosuppressed status - Humira  Has been on Humira for about 3 months  prior to admission with significant reported improvements in symptoms. Last dose of Humira was 6/7/19.   - Not due again for Humira     Essential hypertension  Pressures reviewed, normotensive. Managed prior to admission with lisinopril 30 mg daily and amlodipine 5 mg daily. Continued.      Hypothyroidism  Managed prior to admission with levothyroxine 50 mcg q am, continued.     History of CVA (cerebrovascular accident)  Mixed hyperlipidemia  CVA in 2015. Managed prior to admission with Lipitor 40 mg daily, continued.     Memory deficit  No obvious cognitive deficits on exam. appears baseline and to have a good understanding of current care/plan.  Managed prior to admission with Aricept 10 mg q hs, continued.     Esophageal reflux  Managed prior to admission with omeprazole 20 mg daily, continued.     Overactive bladder  Managed prior to admission with Ditropan XL 10 mg daily, continued.     Depression  Stable mood. Managed prior to admission with Savella 100 mg daily and Prozac 40 mg daily, continued.     Chronic tension-type headache, not intractable  Uses prn Excedrin, available prn.     History of uveitis  Uses eye drops prn, not ordered.     Tobacco dependence syndrome  Is on Chantix 1 mg bid and has cut down to 1 cigarette/day. Patient declines nicotine replacement.  Continued.      Code Status: Full - discussed with patient on admission                   Discharge Instructions and Follow-Up:     Discharge diet:       Regular Diet Adult       Discharge activity: Activity as tolerated   Discharge follow-up: Follow up with primary care provider within 7 days           Discharge Disposition:     Discharged to home      Attestation:  I have reviewed today's vital signs, notes, medications, labs and imaging.  Amount of time performed on this discharge summary: 50 minutes.    Froilan Kennedy MD, MD

## 2019-06-20 ENCOUNTER — HOSPITAL ENCOUNTER (EMERGENCY)
Facility: CLINIC | Age: 54
Discharge: HOME | End: 2019-06-20
Attending: NURSE PRACTITIONER | Admitting: NURSE PRACTITIONER

## 2019-06-20 VITALS
BODY MASS INDEX: 34.83 KG/M2 | HEIGHT: 64 IN | DIASTOLIC BLOOD PRESSURE: 86 MMHG | TEMPERATURE: 98.2 F | WEIGHT: 204 LBS | SYSTOLIC BLOOD PRESSURE: 133 MMHG | OXYGEN SATURATION: 98 %

## 2019-06-20 DIAGNOSIS — M70.30 BURSITIS OF ELBOW: ICD-10-CM

## 2019-06-20 PROCEDURE — G0463 HOSPITAL OUTPT CLINIC VISIT: HCPCS | Performed by: NURSE PRACTITIONER

## 2019-06-20 PROCEDURE — 99214 OFFICE O/P EST MOD 30 MIN: CPT | Mod: Z6 | Performed by: NURSE PRACTITIONER

## 2019-06-20 RX ORDER — LEVOFLOXACIN 500 MG/1
500 TABLET, FILM COATED ORAL DAILY
Qty: 7 TABLET | Refills: 0 | Status: SHIPPED | OUTPATIENT
Start: 2019-06-20 | End: 2019-06-27

## 2019-06-20 RX ORDER — SULFAMETHOXAZOLE/TRIMETHOPRIM 800-160 MG
1 TABLET ORAL 2 TIMES DAILY
Qty: 14 TABLET | Refills: 0 | Status: SHIPPED | OUTPATIENT
Start: 2019-06-20 | End: 2019-06-27

## 2019-06-20 ASSESSMENT — ENCOUNTER SYMPTOMS
COLOR CHANGE: 1
JOINT SWELLING: 1
DIZZINESS: 0
NUMBNESS: 0
FEVER: 0
HEADACHES: 0
CHILLS: 0
ARTHRALGIAS: 0
CARDIOVASCULAR NEGATIVE: 1
MYALGIAS: 1
SHORTNESS OF BREATH: 0
LIGHT-HEADEDNESS: 0
FATIGUE: 0
WEAKNESS: 0
RESPIRATORY NEGATIVE: 1
COUGH: 0

## 2019-06-20 ASSESSMENT — MIFFLIN-ST. JEOR: SCORE: 1510.34

## 2019-06-20 NOTE — ED AVS SNAPSHOT
[facility] Emergency Department  [address] [address]  Phone:  [phone]  Fax:  [phone]                                    Triny Terrell   MRN: [id number]    Department:  [facility] Emergency Department   Date of Visit:  6/20/2019           After Visit Summary Signature Page    I have received my discharge instructions, and my questions have been answered. I have discussed any challenges I see with this plan with the nurse or doctor.    ..........................................................................................................................................  Patient/Patient Representative Signature      ..........................................................................................................................................  Patient Representative Print Name and Relationship to Patient    ..................................................               ................................................  Date                                   Time    ..........................................................................................................................................  Reviewed by Signature/Title    ...................................................              ..............................................  Date                                               Time          22EPIC Rev 08/18

## 2019-06-20 NOTE — ED PROVIDER NOTES
History     Chief Complaint   Patient presents with     Joint Swelling     left elbow     HPI  Triny Terrell is a 54 year old female who presents to the urgent care for evaluation of left elbow pain. Patient noticed pain to the left elbow last night and worsening today. Of note, patient with recent hospital admission for left elbow/arm cellulitis and treated with IV abx. Patient completed 7 days of Levoquin and Bactrim DS today. Patient denies fevers but does complain of myalgias. Pain is present with touch to the area or full extension of the arm. Patient notes a slight change in redness to the elbow as well.    Thrush   Allergies:  Allergies   Allergen Reactions     Amoxicillin      Ampicillin      Sulfa Drugs        Problem List:    Patient Active Problem List    Diagnosis Date Noted     Cellulitis of left upper extremity 06/08/2019     Priority: Medium     Esophageal reflux 06/08/2019     Priority: Medium     Overactive bladder 06/08/2019     Priority: Medium     Immunosuppressed status - Humira 06/08/2019     Priority: Medium     Psoriasis 06/08/2019     Priority: Medium     Depression 06/08/2019     Priority: Medium     Tobacco dependence syndrome 06/08/2019     Priority: Medium     Memory deficit 06/08/2019     Priority: Medium     Skin infection 06/08/2019     Priority: Medium     Pseudophakia, both eyes 10/02/2018     Priority: Medium     Epiretinal membrane (ERM) of right eye 08/27/2018     Priority: Medium     Mixed hyperlipidemia 05/09/2017     Priority: Medium     Hypothyroidism 07/13/2016     Priority: Medium     Vitamin D deficiency 07/13/2016     Priority: Medium     History of uveitis 12/02/2015     Priority: Medium     History of CVA (cerebrovascular accident) 12/01/2015     Priority: Medium     IMPRESSION:     1.  Subacute infarct stable left medial frontal cortex.     2.  Some subtle punctate subacute infarcts more posterior cephalad subcortical left frontal regions suspected.     2.  No  hemorrhage or masses.  4.  No demyelination.     5.  No areas of acute ischemia.  6.  Some motion artifact obscures detail.        Chronic tension-type headache, not intractable 12/01/2015     Priority: Medium     Ankylosing spondylitis (H) 01/13/2014     Priority: Medium     Disorder of sacrum 01/13/2014     Priority: Medium     Unspecified iridocyclitis 10/07/2013     Priority: Medium     Sacroiliitis, not elsewhere classified (H) 05/15/2013     Priority: Medium     Essential hypertension 10/21/2009     Priority: Medium     Migraine with aura 09/22/2009     Priority: Medium     Myalgia and myositis 07/24/2008     Priority: Medium        Past Medical History:    Past Medical History:   Diagnosis Date     Ankylosing spondylitis (H)      CVA (cerebral vascular accident) - 2015      Dysplasia of cervix, unspecified      Psoriasis        Past Surgical History:    Past Surgical History:   Procedure Laterality Date     CHOLECYSTECTOMY       COLONOSCOPY       SI INJECTION BILATERAL       SURGICAL HISTORY OF -   08/30/94    LEEP Cervical Biopsy     SURGICAL HISTORY OF -   06/27/94    Colposcopy - Cervical biopsy     SURGICAL HISTORY OF -   08/14/92    Cervical Tissue     TUBAL LIGATION         Family History:    Family History   Problem Relation Age of Onset     Arthritis Mother      Hypertension Mother      Migraines Mother      Alzheimer Disease Father      Prostate Cancer Father      Gout Father      Hypertension Father      Hypertension Brother      Alcoholism Maternal Grandfather      Alcoholism Paternal Grandfather      Breast Cancer Maternal Aunt        Social History:  Marital Status:   [2]  Social History     Tobacco Use     Smoking status: Current Every Day Smoker     Types: Cigarettes     Smokeless tobacco: Never Used     Tobacco comment: As of 6/8/19 - down to 1 cigarette/day, on Chantix   Substance Use Topics     Alcohol use: Yes     Drug use: Never        Medications:      levofloxacin (LEVAQUIN) 500  "MG tablet   sulfamethoxazole-trimethoprim (BACTRIM DS) 800-160 MG tablet   adalimumab (HUMIRA) 40 MG/0.8ML prefilled syringe kit   albuterol (PROAIR HFA/PROVENTIL HFA/VENTOLIN HFA) 108 (90 Base) MCG/ACT inhaler   albuterol (PROVENTIL) (2.5 MG/3ML) 0.083% neb solution   amLODIPine (NORVASC) 5 MG tablet   atorvastatin (LIPITOR) 40 MG tablet   cyclobenzaprine (FLEXERIL) 5 MG tablet   donepezil (ARICEPT) 10 MG tablet   FLUoxetine (PROZAC) 40 MG capsule   fluticasone (FLONASE) 50 MCG/ACT nasal spray   levothyroxine (SYNTHROID/LEVOTHROID) 50 MCG tablet   lidocaine (XYLOCAINE) 5 % external ointment   lisinopril (PRINIVIL/ZESTRIL) 30 MG tablet   meloxicam (MOBIC) 15 MG tablet   milnacipran (SAVELLA) 100 MG TABS tablet   omeprazole (PRILOSEC OTC) 20 MG EC tablet   ondansetron (ZOFRAN-ODT) 8 MG ODT tab   oxybutynin ER (DITROPAN-XL) 10 MG 24 hr tablet   varenicline (CHANTIX) 1 MG tablet         Review of Systems   Constitutional: Negative for chills, fatigue and fever.   Respiratory: Negative.  Negative for cough and shortness of breath.    Cardiovascular: Negative.  Negative for chest pain.   Musculoskeletal: Positive for joint swelling and myalgias. Negative for arthralgias.   Skin: Positive for color change. Negative for pallor.   Neurological: Negative for dizziness, weakness, light-headedness, numbness and headaches.       Physical Exam   BP: 133/86  Heart Rate: 74  Temp: 98.2  F (36.8  C)  Height: 162.6 cm (5' 4\")  Weight: 92.5 kg (204 lb)  SpO2: 98 %      Physical Exam   Constitutional: She is oriented to person, place, and time. She appears well-developed and well-nourished. No distress.   Cardiovascular: Normal rate and regular rhythm.   Pulmonary/Chest: Effort normal and breath sounds normal. No respiratory distress.   Musculoskeletal:        Left elbow: She exhibits effusion. She exhibits normal range of motion, no deformity and no laceration. Tenderness found. Olecranon process tenderness noted.   Mild erythema " noted surrounding left elbow. Point tenderness to olecranon process with effusion. Full ROM. No injury/wound near the surrounding area.    Neurological: She is alert and oriented to person, place, and time.   Skin: Skin is warm. Capillary refill takes less than 2 seconds. She is not diaphoretic. There is erythema.       ED Course        Procedures               No results found for this or any previous visit (from the past 24 hour(s)).    Medications - No data to display    Assessments & Plan (with Medical Decision Making)   Patient is a 54 year old female who presents to the urgent care for evaluation of left elbow pain. Patient's physical exam is more consistent with an olecranon bursitis than continued cellulitis. After discussing this with patient she notes that she has had treatment for right olecranon bursitis in the past and that she can appreciate the similar symptoms. Will plan to cover patient for one more week with antibiotics. Ortho referral provided for bursitis follow up. Patient currently taking Mobic as an antiinflammatory, may apply heat/ice to the area. Try to avoid direct contact to the painful site. Patient is to return with any new or worsening symptoms including infection. Patient is agreeable to plan of care, all questions answered. Patient is in no distress upon discharge.     I have reviewed the nursing notes.    I have reviewed the findings, diagnosis, plan and need for follow up with the patient.       Medication List      Started    levofloxacin 500 MG tablet  Commonly known as:  LEVAQUIN  500 mg, Oral, DAILY     sulfamethoxazole-trimethoprim 800-160 MG tablet  Commonly known as:  BACTRIM DS  1 tablet, Oral, 2 TIMES DAILY            Final diagnoses:   Bursitis of elbow       6/20/2019   [facility] EMERGENCY DEPARTMENT     Shireen Barreto, ALONZO CNP  06/20/19 1554